# Patient Record
Sex: FEMALE | Race: WHITE | NOT HISPANIC OR LATINO
[De-identification: names, ages, dates, MRNs, and addresses within clinical notes are randomized per-mention and may not be internally consistent; named-entity substitution may affect disease eponyms.]

---

## 2017-04-24 ENCOUNTER — RESULT REVIEW (OUTPATIENT)
Age: 62
End: 2017-04-24

## 2017-04-25 ENCOUNTER — APPOINTMENT (OUTPATIENT)
Dept: OBGYN | Facility: CLINIC | Age: 62
End: 2017-04-25

## 2018-05-01 ENCOUNTER — TRANSCRIPTION ENCOUNTER (OUTPATIENT)
Age: 63
End: 2018-05-01

## 2018-05-01 ENCOUNTER — RESULT REVIEW (OUTPATIENT)
Age: 63
End: 2018-05-01

## 2018-05-01 ENCOUNTER — APPOINTMENT (OUTPATIENT)
Dept: OBGYN | Facility: CLINIC | Age: 63
End: 2018-05-01
Payer: COMMERCIAL

## 2018-05-01 PROCEDURE — 99396 PREV VISIT EST AGE 40-64: CPT

## 2019-06-11 ENCOUNTER — RESULT REVIEW (OUTPATIENT)
Age: 64
End: 2019-06-11

## 2019-06-11 ENCOUNTER — APPOINTMENT (OUTPATIENT)
Dept: OBGYN | Facility: CLINIC | Age: 64
End: 2019-06-11
Payer: COMMERCIAL

## 2019-06-11 PROCEDURE — 99396 PREV VISIT EST AGE 40-64: CPT

## 2020-02-24 ENCOUNTER — APPOINTMENT (OUTPATIENT)
Dept: OBGYN | Facility: CLINIC | Age: 65
End: 2020-02-24

## 2020-12-04 ENCOUNTER — APPOINTMENT (OUTPATIENT)
Dept: NEUROLOGY | Facility: CLINIC | Age: 65
End: 2020-12-04
Payer: COMMERCIAL

## 2020-12-04 VITALS
HEIGHT: 64 IN | WEIGHT: 135 LBS | SYSTOLIC BLOOD PRESSURE: 124 MMHG | DIASTOLIC BLOOD PRESSURE: 71 MMHG | HEART RATE: 79 BPM | OXYGEN SATURATION: 98 % | BODY MASS INDEX: 23.05 KG/M2 | TEMPERATURE: 98.4 F

## 2020-12-04 PROCEDURE — 99072 ADDL SUPL MATRL&STAF TM PHE: CPT

## 2020-12-04 PROCEDURE — 99205 OFFICE O/P NEW HI 60 MIN: CPT

## 2020-12-04 NOTE — DATA REVIEWED
[de-identified] : GI note appreciated\par pap smear normal\par CTH 2010 normal\par \par at presentation to [place] wbc 17, na 131, CTH normal\par

## 2020-12-04 NOTE — ASSESSMENT
[FreeTextEntry1] : Seizure vs syncope\par Will get MRI brain with and wo darshana and 24hour EEG to look for seizure foci and activity\par will hold off AED for now\par patient advised that she should not be drive until no LOC for 1 yr per [place] law\par the patient will see cards for eval of syncope

## 2020-12-04 NOTE — CONSULT LETTER
[Dear  ___] : Dear  [unfilled], [Consult Letter:] : I had the pleasure of evaluating your patient, [unfilled]. [Please see my note below.] : Please see my note below. [Consult Closing:] : Thank you very much for allowing me to participate in the care of this patient.  If you have any questions, please do not hesitate to contact me. [Sincerely,] : Sincerely, [FreeTextEntry3] : Kaylie Francisco MD, MPH\par

## 2020-12-04 NOTE — REVIEW OF SYSTEMS
[As Noted in HPI] : as noted in HPI [Joint Pain] : joint pain [Fever] : no fever [Anxiety] : no anxiety [Eyesight Problems] : no eyesight problems [Loss Of Hearing] : no hearing loss [Chest Pain] : no chest pain [Cough] : no cough [Vomiting] : no vomiting [Incontinence] : no incontinence [Itching] : no itching [Muscle Weakness] : no muscle weakness [Easy Bleeding] : no tendency for easy bleeding

## 2020-12-04 NOTE — HISTORY OF PRESENT ILLNESS
[FreeTextEntry1] : The patient is here for evaluation of confusion.  About 10 days ago, the patient was noted to have episodes of confusion happening over the course of a day.  The episode started with epigastric discomfort and feeling unwell.  She feel to the ground and hit her head but does not remember how she did so.  No prior similar episodes.  She denies any fever, cough or burining on urination.\par \par She was seen in [facility] at that time and was noted to have elevated wbc and lactate, sodium was low.  Patient says that her BP was low and that she had an EEG which was normal, records not available.\par \par The patient was kept for 2 days and discharged 1 week ago.  She has been doing well and no further similar events.  She feels that the computer flickering lights make her feel unwell.

## 2020-12-08 LAB
ANION GAP SERPL CALC-SCNC: 12 MMOL/L
BUN SERPL-MCNC: 13 MG/DL
CALCIUM SERPL-MCNC: 10.3 MG/DL
CHLORIDE SERPL-SCNC: 99 MMOL/L
CO2 SERPL-SCNC: 28 MMOL/L
CREAT SERPL-MCNC: 0.7 MG/DL
GLUCOSE SERPL-MCNC: 77 MG/DL
POTASSIUM SERPL-SCNC: 5.3 MMOL/L
SODIUM SERPL-SCNC: 139 MMOL/L

## 2020-12-14 ENCOUNTER — APPOINTMENT (OUTPATIENT)
Dept: MRI IMAGING | Facility: HOSPITAL | Age: 65
End: 2020-12-14
Payer: COMMERCIAL

## 2020-12-14 ENCOUNTER — OUTPATIENT (OUTPATIENT)
Dept: OUTPATIENT SERVICES | Facility: HOSPITAL | Age: 65
LOS: 1 days | End: 2020-12-14
Payer: COMMERCIAL

## 2020-12-14 DIAGNOSIS — R56.9 UNSPECIFIED CONVULSIONS: ICD-10-CM

## 2020-12-14 PROCEDURE — 70553 MRI BRAIN STEM W/O & W/DYE: CPT

## 2020-12-14 PROCEDURE — 70553 MRI BRAIN STEM W/O & W/DYE: CPT | Mod: 26

## 2020-12-15 ENCOUNTER — APPOINTMENT (OUTPATIENT)
Dept: OBGYN | Facility: CLINIC | Age: 65
End: 2020-12-15

## 2021-01-05 ENCOUNTER — APPOINTMENT (OUTPATIENT)
Dept: NEUROLOGY | Facility: CLINIC | Age: 66
End: 2021-01-05
Payer: COMMERCIAL

## 2021-01-05 VITALS
SYSTOLIC BLOOD PRESSURE: 133 MMHG | HEART RATE: 73 BPM | BODY MASS INDEX: 23.9 KG/M2 | TEMPERATURE: 97.9 F | OXYGEN SATURATION: 99 % | HEIGHT: 64 IN | DIASTOLIC BLOOD PRESSURE: 74 MMHG | WEIGHT: 140 LBS

## 2021-01-05 DIAGNOSIS — R56.9 UNSPECIFIED CONVULSIONS: ICD-10-CM

## 2021-01-05 PROCEDURE — 99213 OFFICE O/P EST LOW 20 MIN: CPT

## 2021-01-05 PROCEDURE — 99072 ADDL SUPL MATRL&STAF TM PHE: CPT

## 2021-01-05 RX ORDER — AZELASTINE HYDROCHLORIDE 137 UG/1
0.1 SPRAY, METERED NASAL
Qty: 30 | Refills: 0 | Status: ACTIVE | COMMUNITY
Start: 2020-12-28

## 2021-01-05 RX ORDER — RIFAXIMIN 550 MG/1
550 TABLET ORAL
Qty: 42 | Refills: 0 | Status: ACTIVE | COMMUNITY
Start: 2020-07-14

## 2021-01-05 RX ORDER — ALPRAZOLAM 0.5 MG/1
0.5 TABLET ORAL
Qty: 60 | Refills: 0 | Status: ACTIVE | COMMUNITY
Start: 2020-12-07

## 2021-01-05 RX ORDER — CYANOCOBALAMIN 1000 UG/ML
1000 INJECTION INTRAMUSCULAR; SUBCUTANEOUS
Qty: 1 | Refills: 0 | Status: ACTIVE | COMMUNITY
Start: 2020-01-16

## 2021-01-05 RX ORDER — CICLOPIROX 80 MG/ML
8 SOLUTION TOPICAL
Qty: 7 | Refills: 0 | Status: ACTIVE | COMMUNITY
Start: 2020-10-14

## 2021-01-05 RX ORDER — PITAVASTATIN CALCIUM 2.09 MG/1
2 TABLET, FILM COATED ORAL
Qty: 90 | Refills: 0 | Status: ACTIVE | COMMUNITY
Start: 2020-11-10

## 2021-01-05 RX ORDER — MONTELUKAST 10 MG/1
10 TABLET, FILM COATED ORAL
Qty: 90 | Refills: 0 | Status: ACTIVE | COMMUNITY
Start: 2020-11-10

## 2021-01-05 RX ORDER — LEVOFLOXACIN 250 MG/1
250 TABLET, FILM COATED ORAL
Qty: 10 | Refills: 0 | Status: ACTIVE | COMMUNITY
Start: 2020-09-23

## 2021-01-05 RX ORDER — LEVOFLOXACIN 500 MG/1
500 TABLET, FILM COATED ORAL
Qty: 10 | Refills: 0 | Status: ACTIVE | COMMUNITY
Start: 2020-11-30

## 2021-01-05 RX ORDER — FLUTICASONE PROPIONATE 50 UG/1
50 SPRAY, METERED NASAL
Qty: 16 | Refills: 0 | Status: ACTIVE | COMMUNITY
Start: 2020-10-14

## 2021-01-05 RX ORDER — ALENDRONATE SODIUM 70 MG/1
70 TABLET ORAL
Qty: 4 | Refills: 0 | Status: ACTIVE | COMMUNITY
Start: 2020-12-03

## 2021-01-05 RX ORDER — AMOXICILLIN AND CLAVULANATE POTASSIUM 875; 125 MG/1; MG/1
875-125 TABLET, COATED ORAL
Qty: 20 | Refills: 0 | Status: ACTIVE | COMMUNITY
Start: 2020-12-09

## 2021-01-05 RX ORDER — HYDROCORTISONE 2.5% 25 MG/G
2.5 CREAM TOPICAL
Qty: 30 | Refills: 0 | Status: ACTIVE | COMMUNITY
Start: 2020-05-18

## 2021-01-05 RX ORDER — FAMOTIDINE 20 MG/1
20 TABLET, FILM COATED ORAL
Qty: 30 | Refills: 0 | Status: ACTIVE | COMMUNITY
Start: 2020-06-16

## 2021-01-05 RX ORDER — CYCLOBENZAPRINE HYDROCHLORIDE 5 MG/1
5 TABLET, FILM COATED ORAL
Qty: 90 | Refills: 0 | Status: ACTIVE | COMMUNITY
Start: 2020-08-10

## 2021-01-05 NOTE — HISTORY OF PRESENT ILLNESS
[FreeTextEntry1] : The patient is here for evaluation of confusion. About 10 days ago, the patient was noted to have episodes of confusion happening over the course of a day. The episode started with epigastric discomfort and feeling unwell. She feel to the ground and hit her head but does not remember how she did so. No prior similar episodes. She denies any fever, cough or burining on urination.\par \par She was seen in [facility] at that time and was noted to have elevated wbc and lactate, sodium was low. Patient says that her BP was low and that she had an EEG which was normal, records not available.\par \par The patient was kept for 2 days and discharged. She has been doing well and no further similar events. She feels that the computer flickering lights make her feel unwell.  She was seen by cardiologist and told that everything is normal.  She had MRI brain but has not had her LTM yet.\par \par She was found to have sinus infection and has been feeling vertigo since then.  She is currently on antibiotics for the sinus infection.\par

## 2021-01-05 NOTE — ASSESSMENT
[FreeTextEntry1] : Seizure vs syncope\par Will get 24hour EEG to look for seizure foci and activity\par will hold off AED for now\par patient advised that she should not be drive until no LOC for 1 yr per [place] law\par the patient will get records from cards for eval of syncope. \par \par Vertigo, peripheral due to labyrinthitis\par patient will fu with her PMD for treatment of sinus infection\par \par RTC after 24hour EEG

## 2021-01-05 NOTE — PHYSICAL EXAM
[General Appearance - Alert] : alert [General Appearance - In No Acute Distress] : in no acute distress [Person] : oriented to person [Place] : oriented to place [Time] : oriented to time [Registration Intact] : recent registration memory intact [Concentration Intact] : normal concentrating ability [Naming Objects] : no difficulty naming common objects [Repeating Phrases] : no difficulty repeating a phrase [Fluency] : fluency intact [Comprehension] : comprehension intact [Vocabulary] : adequate range of vocabulary [Cranial Nerves Optic (II)] : visual acuity intact bilaterally,  visual fields full to confrontation, pupils equal round and reactive to light [Cranial Nerves Oculomotor (III)] : extraocular motion intact [Cranial Nerves Trigeminal (V)] : facial sensation intact symmetrically [Cranial Nerves Facial (VII)] : face symmetrical [Abnormal Walk] : normal gait [Balance] : balance was intact

## 2021-02-04 ENCOUNTER — APPOINTMENT (OUTPATIENT)
Dept: OBGYN | Facility: CLINIC | Age: 66
End: 2021-02-04

## 2021-03-01 ENCOUNTER — APPOINTMENT (OUTPATIENT)
Dept: NEUROLOGY | Facility: CLINIC | Age: 66
End: 2021-03-01

## 2021-06-14 ENCOUNTER — APPOINTMENT (OUTPATIENT)
Dept: OBGYN | Facility: CLINIC | Age: 66
End: 2021-06-14
Payer: COMMERCIAL

## 2021-06-14 DIAGNOSIS — R92.2 INCONCLUSIVE MAMMOGRAM: ICD-10-CM

## 2021-06-14 DIAGNOSIS — Z12.39 ENCOUNTER FOR OTHER SCREENING FOR MALIGNANT NEOPLASM OF BREAST: ICD-10-CM

## 2021-06-14 PROCEDURE — 99397 PER PM REEVAL EST PAT 65+ YR: CPT

## 2021-06-14 PROCEDURE — 99072 ADDL SUPL MATRL&STAF TM PHE: CPT

## 2021-11-16 ENCOUNTER — APPOINTMENT (OUTPATIENT)
Dept: OBGYN | Facility: CLINIC | Age: 66
End: 2021-11-16
Payer: COMMERCIAL

## 2021-11-16 VITALS
WEIGHT: 120 LBS | DIASTOLIC BLOOD PRESSURE: 70 MMHG | SYSTOLIC BLOOD PRESSURE: 102 MMHG | BODY MASS INDEX: 20.49 KG/M2 | HEIGHT: 64 IN

## 2021-11-16 DIAGNOSIS — N76.2 ACUTE VULVITIS: ICD-10-CM

## 2021-11-16 DIAGNOSIS — N76.0 ACUTE VAGINITIS: ICD-10-CM

## 2021-11-16 PROCEDURE — 99213 OFFICE O/P EST LOW 20 MIN: CPT

## 2021-11-16 RX ORDER — TRIAMCINOLONE ACETONIDE 5 MG/G
0.5 OINTMENT TOPICAL TWICE DAILY
Qty: 30 | Refills: 0 | Status: ACTIVE | COMMUNITY
Start: 2021-11-16 | End: 1900-01-01

## 2021-11-16 NOTE — HISTORY OF PRESENT ILLNESS
[FreeTextEntry1] : 66 year old female who presents today with vulvovaginal itching and burning. She was seen in 6/2021 with vaginal dryness. She had been using Replens and Uberlube. She thinks the Replens irritates her. It has been two weeks since she had sex and used the Replens last Thursday and has been getting burning externally and itchiness. Denies any discharge. She had some brown spotting. Denies any pain during sex. She is with a new partner. She is not using a condom because they irritate her. Her IBS has been flaring up. She can feel a bulge in  her vagina. [TextBox_4] : Patient c/o vaginal itching and burning

## 2021-11-16 NOTE — PHYSICAL EXAM
[Appropriately responsive] : appropriately responsive [Labia Majora] : normal [Labia Minora] : normal [Cystocele] : a cystocele [Normal] : normal [Uterine Adnexae] : normal

## 2021-11-16 NOTE — END OF VISIT
[FreeTextEntry3] : Documented by Soniya Peace acting as scribe for Dr. Hampton on 11/16/2021 \par \par All Medical record entries made by the Scribe were at my, Dr. Hampton's, direction and personally dictated by me on 11/16/2021  . I have reviewed the chart and agree that the record accurately reflects my personal performance of the history, physical exam, assessment and plan. I have also personally directed, reviewed, and agreed with the discharge instructions.

## 2021-11-16 NOTE — PLAN
[FreeTextEntry1] : -culture for BV/yeast/trichomonas, GC/CT\par -discussed r/b/a PT for cystocele and urinary incontinence\par -Triamcinolone ointment BID prn\par -d/c Replens and use lubricant during intercourse

## 2021-11-19 LAB
C TRACH RRNA SPEC QL NAA+PROBE: NOT DETECTED
N GONORRHOEA RRNA SPEC QL NAA+PROBE: NOT DETECTED
SOURCE AMPLIFICATION: NORMAL

## 2021-11-22 LAB
CANDIDA VAG CYTO: NOT DETECTED
G VAGINALIS+PREV SP MTYP VAG QL MICRO: NOT DETECTED
T VAGINALIS VAG QL WET PREP: NOT DETECTED

## 2022-11-07 PROBLEM — Z12.39 SCREENING BREAST EXAMINATION: Status: ACTIVE | Noted: 2022-11-07

## 2022-11-07 PROBLEM — R92.2 DENSE BREASTS: Status: ACTIVE | Noted: 2022-11-07

## 2022-12-19 ENCOUNTER — APPOINTMENT (OUTPATIENT)
Dept: OBGYN | Facility: CLINIC | Age: 67
End: 2022-12-19

## 2022-12-19 VITALS
HEIGHT: 64 IN | OXYGEN SATURATION: 99 % | SYSTOLIC BLOOD PRESSURE: 128 MMHG | WEIGHT: 127 LBS | BODY MASS INDEX: 21.68 KG/M2 | HEART RATE: 65 BPM | DIASTOLIC BLOOD PRESSURE: 58 MMHG

## 2022-12-19 DIAGNOSIS — Z11.51 ENCOUNTER FOR SCREENING FOR HUMAN PAPILLOMAVIRUS (HPV): ICD-10-CM

## 2022-12-19 DIAGNOSIS — Z01.419 ENCOUNTER FOR GYNECOLOGICAL EXAMINATION (GENERAL) (ROUTINE) W/OUT ABNORMAL FINDINGS: ICD-10-CM

## 2022-12-19 DIAGNOSIS — R32 UNSPECIFIED URINARY INCONTINENCE: ICD-10-CM

## 2022-12-19 PROCEDURE — 99397 PER PM REEVAL EST PAT 65+ YR: CPT

## 2022-12-19 NOTE — HISTORY OF PRESENT ILLNESS
[FreeTextEntry1] : 67 year old  female presents for an annual wellness check. Last seen 2021 for vaginitis symptoms. Her last annual was 2021. Reports having a UTI since last visit and was treated by PCP. Also reports trying Replens for vaginal dryness but has irritation with it.  she complains of urinary incontinence, has been doing Kegel exercises but unsure of her technique.\par \par She has 2 grandsons.  She does not drive due to history of seizures.\par \par OBHx:  x2\par PMHx: osteoporosis  [Mammogramdate] : 11/22 [PapSmeardate] : 06/19

## 2022-12-19 NOTE — PLAN
[FreeTextEntry1] : 67 year old female presents for annual\par \par -PAP/HPV done\par -Breast mammo utd (11/2022)\par -Rx for pelvic floor physical therapy\par -pt advised Luvena\par \par RTO in 1 year

## 2022-12-20 LAB — HPV HIGH+LOW RISK DNA PNL CVX: NOT DETECTED

## 2023-01-03 LAB — CYTOLOGY CVX/VAG DOC THIN PREP: ABNORMAL

## 2023-12-19 ENCOUNTER — APPOINTMENT (OUTPATIENT)
Dept: OBGYN | Facility: CLINIC | Age: 68
End: 2023-12-19

## 2024-01-19 ENCOUNTER — APPOINTMENT (OUTPATIENT)
Dept: OBGYN | Facility: CLINIC | Age: 69
End: 2024-01-19

## 2024-02-08 ENCOUNTER — NON-APPOINTMENT (OUTPATIENT)
Age: 69
End: 2024-02-08